# Patient Record
Sex: MALE | Race: BLACK OR AFRICAN AMERICAN | NOT HISPANIC OR LATINO | Employment: FULL TIME | URBAN - METROPOLITAN AREA
[De-identification: names, ages, dates, MRNs, and addresses within clinical notes are randomized per-mention and may not be internally consistent; named-entity substitution may affect disease eponyms.]

---

## 2023-12-30 ENCOUNTER — APPOINTMENT (OUTPATIENT)
Dept: RADIOLOGY | Facility: HOSPITAL | Age: 23
End: 2023-12-30
Payer: COMMERCIAL

## 2023-12-30 ENCOUNTER — HOSPITAL ENCOUNTER (EMERGENCY)
Facility: HOSPITAL | Age: 23
Discharge: HOME | End: 2023-12-30
Attending: EMERGENCY MEDICINE
Payer: COMMERCIAL

## 2023-12-30 VITALS
HEIGHT: 72 IN | RESPIRATION RATE: 18 BRPM | HEART RATE: 98 BPM | OXYGEN SATURATION: 98 % | DIASTOLIC BLOOD PRESSURE: 80 MMHG | TEMPERATURE: 98.8 F | SYSTOLIC BLOOD PRESSURE: 154 MMHG | BODY MASS INDEX: 40.31 KG/M2 | WEIGHT: 297.62 LBS

## 2023-12-30 DIAGNOSIS — S62.655A NONDISPLACED FRACTURE OF MIDDLE PHALANX OF LEFT RING FINGER, INITIAL ENCOUNTER FOR CLOSED FRACTURE: Primary | ICD-10-CM

## 2023-12-30 PROCEDURE — 99283 EMERGENCY DEPT VISIT LOW MDM: CPT | Performed by: EMERGENCY MEDICINE

## 2023-12-30 PROCEDURE — 73130 X-RAY EXAM OF HAND: CPT | Mod: LT

## 2023-12-30 PROCEDURE — 2500000001 HC RX 250 WO HCPCS SELF ADMINISTERED DRUGS (ALT 637 FOR MEDICARE OP): Performed by: EMERGENCY MEDICINE

## 2023-12-30 RX ORDER — IBUPROFEN 600 MG/1
600 TABLET ORAL ONCE
Status: COMPLETED | OUTPATIENT
Start: 2023-12-30 | End: 2023-12-30

## 2023-12-30 RX ADMIN — IBUPROFEN 600 MG: 600 TABLET, FILM COATED ORAL at 17:59

## 2023-12-30 ASSESSMENT — COLUMBIA-SUICIDE SEVERITY RATING SCALE - C-SSRS
6. HAVE YOU EVER DONE ANYTHING, STARTED TO DO ANYTHING, OR PREPARED TO DO ANYTHING TO END YOUR LIFE?: NO
2. HAVE YOU ACTUALLY HAD ANY THOUGHTS OF KILLING YOURSELF?: NO
1. IN THE PAST MONTH, HAVE YOU WISHED YOU WERE DEAD OR WISHED YOU COULD GO TO SLEEP AND NOT WAKE UP?: NO

## 2023-12-30 ASSESSMENT — PAIN DESCRIPTION - PAIN TYPE: TYPE: ACUTE PAIN

## 2023-12-30 ASSESSMENT — PAIN DESCRIPTION - LOCATION: LOCATION: FINGER (COMMENT WHICH ONE)

## 2023-12-30 ASSESSMENT — PAIN DESCRIPTION - ONSET: ONSET: SUDDEN

## 2023-12-30 ASSESSMENT — PAIN SCALES - GENERAL: PAINLEVEL_OUTOF10: 10 - WORST POSSIBLE PAIN

## 2023-12-30 ASSESSMENT — PAIN DESCRIPTION - PROGRESSION: CLINICAL_PROGRESSION: GRADUALLY WORSENING

## 2023-12-30 ASSESSMENT — PAIN - FUNCTIONAL ASSESSMENT: PAIN_FUNCTIONAL_ASSESSMENT: 0-10

## 2023-12-30 ASSESSMENT — PAIN DESCRIPTION - ORIENTATION: ORIENTATION: LEFT

## 2023-12-30 ASSESSMENT — PAIN DESCRIPTION - FREQUENCY: FREQUENCY: CONSTANT/CONTINUOUS

## 2023-12-30 ASSESSMENT — PAIN DESCRIPTION - DESCRIPTORS: DESCRIPTORS: ACHING

## 2023-12-30 NOTE — DISCHARGE INSTRUCTIONS
Rest, ice and elevation.    Keep splint on at all times.    Keep splint dry.    Tylenol or ibuprofen over-the-counter as needed.    Follow-up with the specialist below in 3 to 4 days.

## 2023-12-30 NOTE — ED PROVIDER NOTES
HPI   Chief Complaint   Patient presents with    Hand Pain     Left ring finger pain jammed and injured while wrestling 30 mins ago. Unable to open finger up all the way        The patient is a 23-year-old male who presents for evaluation of a left ring finger injury.  Shortly before arrival the patient was at home wrestling around with his younger brother when he somehow injured his left ring finger.  He presents now complaining of pain and swelling to the ring finger.  He denies any pain to the thumb, index, middle or small fingers.  Denies any pain proximal to the ring finger.  No other injuries or concerns.                              No data recorded                Patient History   Past Medical History:   Diagnosis Date    Personal history of diseases of the skin and subcutaneous tissue     History of psoriasis    Personal history of other mental and behavioral disorders     History of attention deficit hyperactivity disorder (ADHD)     Past Surgical History:   Procedure Laterality Date    OTHER SURGICAL HISTORY  11/13/2018    Cervical lymph node biopsy     No family history on file.  Social History     Tobacco Use    Smoking status: Unknown    Smokeless tobacco: Not on file   Substance Use Topics    Alcohol use: Not on file    Drug use: Not on file       Physical Exam   ED Triage Vitals [12/30/23 1508]   Temp Heart Rate Resp BP   37.1 °C (98.8 °F) 98 18 154/80      SpO2 Temp Source Heart Rate Source Patient Position   98 % Oral Monitor Sitting      BP Location FiO2 (%)     Right arm --       Physical Exam  Constitutional:       General: He is not in acute distress.     Appearance: Normal appearance.   HENT:      Head: Normocephalic and atraumatic.      Mouth/Throat:      Mouth: Mucous membranes are moist.      Pharynx: Oropharynx is clear. No oropharyngeal exudate or posterior oropharyngeal erythema.   Eyes:      Extraocular Movements: Extraocular movements intact.      Conjunctiva/sclera: Conjunctivae  normal.      Pupils: Pupils are equal, round, and reactive to light.   Cardiovascular:      Rate and Rhythm: Normal rate and regular rhythm.      Heart sounds: No murmur heard.  Pulmonary:      Effort: Pulmonary effort is normal.      Breath sounds: Normal breath sounds. No wheezing, rhonchi or rales.   Abdominal:      General: Abdomen is flat. There is no distension.      Palpations: Abdomen is soft.      Tenderness: There is no abdominal tenderness.   Musculoskeletal:      Cervical back: Normal range of motion and neck supple. No rigidity or tenderness.      Comments: Examination of the left hand reveals some swelling and tenderness to the middle third of the ring finger.  There is tenderness at both the PIP and DIP joints and maximal tenderness over the middle phalanx.  Digits 1, 2, 3 and 5 are all nontender.  There is no metacarpal tenderness.   Lymphadenopathy:      Cervical: No cervical adenopathy.   Skin:     General: Skin is warm and dry.      Findings: No rash.   Neurological:      Mental Status: He is alert.   Psychiatric:         Mood and Affect: Mood normal.         Behavior: Behavior normal.         ED Course & MDM   Diagnoses as of 12/30/23 1753   Nondisplaced fracture of middle phalanx of left ring finger, initial encounter for closed fracture       Medical Decision Making  The patient was sent for x-rays of the left hand and films were read by the radiologist.  There is an acute oblique fracture involving the mid diaphysis of the middle phalanx of the ring finger.    The patient was placed in an AlumaFoam finger splint by the nursing staff.  He was given a dose of ibuprofen.  He will be discharged home with instructions to rest, ice and elevate and take ibuprofen over-the-counter.  He was given a referral to orthopedics hand for follow-up in 3 to 4 days.        Procedure  Procedures     Jordan Salmon, DO  12/30/23 1753

## 2024-01-22 ENCOUNTER — EVALUATION (OUTPATIENT)
Dept: OCCUPATIONAL THERAPY | Facility: CLINIC | Age: 24
End: 2024-01-22
Payer: COMMERCIAL

## 2024-01-22 DIAGNOSIS — S62.655A NONDISPLACED FRACTURE OF MIDDLE PHALANX OF LEFT RING FINGER, INITIAL ENCOUNTER FOR CLOSED FRACTURE: ICD-10-CM

## 2024-01-22 PROCEDURE — L3933 FO W/O JOINTS CF: HCPCS | Performed by: OCCUPATIONAL THERAPIST

## 2024-01-22 ASSESSMENT — PAIN SCALES - GENERAL: PAINLEVEL_OUTOF10: 2

## 2024-01-22 ASSESSMENT — PAIN - FUNCTIONAL ASSESSMENT: PAIN_FUNCTIONAL_ASSESSMENT: 0-10

## 2024-01-22 NOTE — PROGRESS NOTES
"Rehab Walk-in Note    Patient Name: Flavio Baumann Jr.  MRN: [id number]  Today's Date: 1/22/2024    Referring Physician: Dr. Reynoso    Time in: 13:53  Time out: 14:20  Splinting Time Entry: 27 ()    Subjective   Current Problem: L RF P2 fx  History of Current Problem: Patient reports he was wrestling around with his brother and sustained injury. Upon presenting to Urgent Care splinted in extension; presented to Dr. Reynoso on 1/12/24 and given OT referral; in this date ~3 weeks post-injury with aluminium extension splint; no written order; per patient in for splint fabrication.      Objective   General Visit Information:      Clinical Presentation: Moderate edema throughout L RF; no noted deformity; denies abnormal sensation; AROM L RF WFL per observation.   Splint Type: Digit extension splint  Treatment order: Splint fabrication   Precautions: Splint AAT; light use of L hand       Pain:  Pain Assessment  Pain Assessment: 0-10  Pain Score: 2  \"Tender\" along volar aspect of P2 L RF    Home Living: Available assist     Prior Level of Function: Fully Independent       Assessment/Plan   Therapist fabricated/fitted digital extension splint for patient's L RF.  Patient instructed patient in proper don/doff tech, wear recommendations, importance of skin monitoring, and splint care; written instruction provided.   Patient to wear splint at all times as instructed patient physician/therapist.  Therapist educated patient on edema management strategies; provided patient with finger compression sleeve/supplies to promote lymphatic drainage.   Patient reports he is to MD this date; will contact clinic with further needs.  Plan: TBD  "

## 2025-08-02 ENCOUNTER — HOSPITAL ENCOUNTER (EMERGENCY)
Facility: HOSPITAL | Age: 25
Discharge: HOME | End: 2025-08-02
Payer: COMMERCIAL

## 2025-08-02 ENCOUNTER — APPOINTMENT (OUTPATIENT)
Dept: RADIOLOGY | Facility: HOSPITAL | Age: 25
End: 2025-08-02
Payer: COMMERCIAL

## 2025-08-02 VITALS
OXYGEN SATURATION: 100 % | SYSTOLIC BLOOD PRESSURE: 151 MMHG | DIASTOLIC BLOOD PRESSURE: 84 MMHG | BODY MASS INDEX: 41.51 KG/M2 | TEMPERATURE: 97.7 F | RESPIRATION RATE: 18 BRPM | HEART RATE: 78 BPM | WEIGHT: 306.44 LBS | HEIGHT: 72 IN

## 2025-08-02 DIAGNOSIS — M25.512 ACUTE PAIN OF LEFT SHOULDER: Primary | ICD-10-CM

## 2025-08-02 PROCEDURE — 99283 EMERGENCY DEPT VISIT LOW MDM: CPT

## 2025-08-02 PROCEDURE — 73030 X-RAY EXAM OF SHOULDER: CPT | Mod: LEFT SIDE | Performed by: STUDENT IN AN ORGANIZED HEALTH CARE EDUCATION/TRAINING PROGRAM

## 2025-08-02 PROCEDURE — 73030 X-RAY EXAM OF SHOULDER: CPT | Mod: LT

## 2025-08-02 RX ORDER — IBUPROFEN 800 MG/1
800 TABLET, FILM COATED ORAL 3 TIMES DAILY
Qty: 21 TABLET | Refills: 0 | Status: SHIPPED | OUTPATIENT
Start: 2025-08-02 | End: 2025-08-09

## 2025-08-02 RX ORDER — CYCLOBENZAPRINE HCL 10 MG
10 TABLET ORAL 2 TIMES DAILY PRN
Qty: 20 TABLET | Refills: 0 | Status: SHIPPED | OUTPATIENT
Start: 2025-08-02 | End: 2025-08-12

## 2025-08-02 ASSESSMENT — PAIN SCALES - GENERAL: PAINLEVEL_OUTOF10: 7

## 2025-08-02 ASSESSMENT — PAIN - FUNCTIONAL ASSESSMENT: PAIN_FUNCTIONAL_ASSESSMENT: 0-10

## 2025-08-07 NOTE — ED PROVIDER NOTES
HPI   Chief Complaint   Patient presents with    Shoulder Pain     States he was walking tripped and fell into a wall hitting his shoulder.       HPI  Patient is a 24-year-old male who presents to ED for chief complaint of left shoulder pain.  Patient states he fell onto a wall when he accidentally tripped at home.  Denies any other injuries or complaints.  No blood thinner use.  Patient has full range of motion, does states it feels funny.      Patient History   Medical History[1]  Surgical History[2]  Family History[3]  Social History[4]    Physical Exam   ED Triage Vitals [08/02/25 1636]   Temperature Heart Rate Respirations BP   36.5 °C (97.7 °F) 78 18 151/84      Pulse Ox Temp src Heart Rate Source Patient Position   100 % -- -- Sitting      BP Location FiO2 (%)     Right arm --       Physical Exam  Vitals reviewed.   Constitutional:       General: She is not in acute distress.     Appearance: Normal appearance. She is not ill-appearing.   HENT:      Head: Normocephalic and atraumatic.   Eyes:      Extraocular Movements: Extraocular movements intact.   Cardiovascular:      Rate and Rhythm: Normal rate and regular rhythm.      Heart sounds: Normal heart sounds.   Pulmonary:      Effort: Pulmonary effort is normal.      Breath sounds: Normal breath sounds.   Abdominal:      Palpations: Abdomen is soft.      Tenderness: There is no abdominal tenderness.   Musculoskeletal:         General: Normal range of motion.      Cervical back: Normal range of motion and neck supple.   Skin:     General: Skin is warm and dry.   Neurological:      General: No focal deficit present.      Mental Status: She is alert and oriented to person, place, and time.   Psychiatric:         Mood and Affect: Mood normal.         Behavior: Behavior normal.    ED Course & MDM   Diagnoses as of 08/07/25 0949   Acute pain of left shoulder                 No data recorded     Smock Coma Scale Score: 15 (08/02/25 1642 : Ysabel Magallon RN)                            Medical Decision Making  Parts of this chart have been completed using voice recognition software. Please excuse any errors of transcription.  My thought process and reason for plan has been formulated from the time that I saw the patient until the time of disposition and is not specific to one specific moment during their visit and furthermore my MDM encompasses this entire chart and not only this text box.    HPI:   A medically appropriate HPI was obtained, outlined above.    Flavio Baumann Jr. is a  24 y.o. male    Chief Complaint   Patient presents with    Shoulder Pain     States he was walking tripped and fell into a wall hitting his shoulder.       Medical History[5]    Surgical History[6]    Social History[7]    Family History[8]    Allergies[9]    Current Outpatient Medications   Medication Instructions    cyclobenzaprine (FLEXERIL) 10 mg, oral, 2 times daily PRN    ibuprofen 800 mg, oral, 3 times daily   for details    Exam:   No data found.    A medically appropriate exam performed, outlined above, given the known history and presentation.    EKG/Cardiac monitor:   If EKG was done and, it was interpreted by attending physician, see their note for ED course for more detail.    Medications given during visit:  Medications - No data to display     Diagnostic/tests:  Labs Reviewed - No data to display     XR shoulder left 2+ views   Final Result   Probable vascular channel the mid clavicle. A nondisplaced fracture   may be a similar appearance. Correlate with point tenderness.        MACRO:   None        Signed by: Blas Bernal 8/2/2025 5:45 PM   Dictation workstation:   [id number]             [facility] Summary:  Patient has no point tenderness over clavicle.  Low suspicion for fracture.  Conservative treatment measures discussed.  Aftercare instructions provided.    We have discussed the diagnosis and risks, and we agree with discharging home to follow-up with appropriate physician as  directed. We also discussed returning to the Emergency Department immediately if new or worsening symptoms occur. We have discussed the symptoms which are most concerning that necessitate immediate return. Pt symptoms have been well controlled here and the patient is safe for discharge with appropriate outpatient follow up. The patient has verbalized understanding to return to ER without delay for new or worsening pains or for any other symptoms or concerns. I utilized the discharge clinical management tool provided [employer] to help estimate risk of negative outcome for this patient.        Disposition:  ED Prescriptions       Medication Sig Dispense Start Date End Date Auth. Provider    ibuprofen 800 mg tablet Take 1 tablet (800 mg) by mouth 3 times a day for 7 days. 21 tablet 8/2/2025 8/9/2025 Yifan Hughes PA-C    cyclobenzaprine (Flexeril) 10 mg tablet Take 1 tablet (10 mg) by mouth 2 times a day as needed for muscle spasms for up to 10 days. 20 tablet 8/2/2025 8/12/2025 Yifan Hughes PA-C            All of the patient's questions were answered to the best of my ability. Patient states understanding that they have been screened for an emergency today and we have not found any etiology of symptoms that requires emergent treatment or admission to the hospital at this point. They understand that they may have not had definitive care today and require follow-up for treatment of their condition. They also state understanding that they may have an emergent condition that may potentially have not of detected at this visit and they must return to the emergency department if they develop any worsening of symptoms or new complaints.       Procedure  Procedures       [1]   Past Medical History:  Diagnosis Date    Personal history of diseases of the skin and subcutaneous tissue     History of psoriasis    Personal history of other mental and behavioral disorders     History of attention deficit  hyperactivity disorder (ADHD)   [2]   Past Surgical History:  Procedure Laterality Date    OTHER SURGICAL HISTORY  11/13/2018    Cervical lymph node biopsy   [3] No family history on file.  [4]   Social History  Tobacco Use    Smoking status: Unknown    Smokeless tobacco: Not on file   Substance Use Topics    Alcohol use: Not on file    Drug use: Not on file   [5]   Past Medical History:  Diagnosis Date    Personal history of diseases of the skin and subcutaneous tissue     History of psoriasis    Personal history of other mental and behavioral disorders     History of attention deficit hyperactivity disorder (ADHD)   [6]   Past Surgical History:  Procedure Laterality Date    OTHER SURGICAL HISTORY  11/13/2018    Cervical lymph node biopsy   [7]   Social History  Tobacco Use    Smoking status: Unknown   [8] No family history on file.  [9] No Known Allergies       Yifan Hughes PA-C  08/07/25 09